# Patient Record
Sex: FEMALE | Race: WHITE | NOT HISPANIC OR LATINO | Employment: OTHER | URBAN - METROPOLITAN AREA
[De-identification: names, ages, dates, MRNs, and addresses within clinical notes are randomized per-mention and may not be internally consistent; named-entity substitution may affect disease eponyms.]

---

## 2019-02-28 ENCOUNTER — TELEPHONE (OUTPATIENT)
Dept: NEUROLOGY | Facility: HOSPITAL | Age: 74
End: 2019-02-28

## 2019-02-28 NOTE — TELEPHONE ENCOUNTER
----- Message from Ijeoma Vo sent at 2/28/2019 11:54 AM CST -----  Contact: Patients daughter  GI- Patients rudy Hensley would like a call back at [phone].

## 2019-02-28 NOTE — TELEPHONE ENCOUNTER
Shellie requesting information on referral.  Shellie to be notified with recommendation from Dr. Lofton.

## 2019-03-01 ENCOUNTER — TELEPHONE (OUTPATIENT)
Dept: NEUROLOGY | Facility: HOSPITAL | Age: 74
End: 2019-03-01

## 2019-03-01 NOTE — TELEPHONE ENCOUNTER
Shellie, daughter, notified Dr. Lofton discussed mother's case with Dr. Light. Dr. Lofton recommend pt to follow up with Dr. Light in [place] to discuss anemia. Shellie states mother has appt with Dr. Light.

## 2019-03-01 NOTE — TELEPHONE ENCOUNTER
----- Message from Maya Pierre sent at 2/28/2019  4:24 PM CST -----  Contact: Patient's daughter Shellie  GI:  Shellie, patient's daughter, returned nurse call.  Shellie can be reached at [phone].  Thanks  abc

## 2019-03-22 ENCOUNTER — TELEPHONE (OUTPATIENT)
Dept: NEUROLOGY | Facility: HOSPITAL | Age: 74
End: 2019-03-22

## 2019-03-22 NOTE — TELEPHONE ENCOUNTER
Rosibel with [facility] in [place] notified Dr. Lofton requested on 2/28/19 to have pt seen by Dr. Light again for anemia.  Rosibel acknowledged understanding.

## 2019-03-22 NOTE — TELEPHONE ENCOUNTER
----- Message from Jose Daniel Infante sent at 3/22/2019 10:44 AM CDT -----  Contact: [facility] [place] [phone]  Caller requested to speak with the nurse to verify if the doctor received the referral they faxed over in Feb. Please call.

## 2019-03-27 ENCOUNTER — TELEPHONE (OUTPATIENT)
Dept: NEUROLOGY | Facility: HOSPITAL | Age: 74
End: 2019-03-27

## 2019-03-27 NOTE — TELEPHONE ENCOUNTER
----- Message from Ijeoma Vo sent at 3/22/2019  3:55 PM CDT -----  Regarding: NET NET  Contact: [phone]  New patient - Who called: Clinic Visit Note faxed form [facility] [place] [phone]    Referred by:[facility] [place]    Why do you need to be seen? 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

## 2019-03-28 NOTE — TELEPHONE ENCOUNTER
Pt has anemia problem and hx NET.  Galina discussing with Dr. Emery and Dr. Augustine office for next steps.

## 2019-03-28 NOTE — TELEPHONE ENCOUNTER
----- Message from Ijeoma Vo sent at 3/22/2019  3:55 PM CDT -----  Regarding: NET NET  Contact: [phone]  New patient - Who called: Clinic Visit Note faxed form [facility] [place] [phone]    Referred by:[facility] [place]    Why do you need to be seen? 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

## 2019-11-18 PROBLEM — C7A.1 HIGH GRADE NEUROENDOCRINE CARCINOMA: Status: ACTIVE | Noted: 2019-11-18

## 2019-11-18 PROBLEM — C92.10 CML (CHRONIC MYELOCYTIC LEUKEMIA): Status: ACTIVE | Noted: 2019-11-18

## 2019-11-18 PROBLEM — D53.9 MACROCYTIC ANEMIA: Status: ACTIVE | Noted: 2019-11-18

## 2019-11-18 PROBLEM — Z85.3 HISTORY OF BILATERAL BREAST CANCER: Status: ACTIVE | Noted: 2019-11-18

## 2019-11-18 PROBLEM — N18.30 STAGE 3 CHRONIC KIDNEY DISEASE: Status: ACTIVE | Noted: 2019-11-18

## 2019-12-11 PROBLEM — I35.0 AORTIC STENOSIS, MODERATE: Status: ACTIVE | Noted: 2019-10-23

## 2019-12-11 PROBLEM — D50.0 IRON DEFICIENCY ANEMIA DUE TO CHRONIC BLOOD LOSS: Status: ACTIVE | Noted: 2019-02-09

## 2019-12-11 PROBLEM — I10 ESSENTIAL HYPERTENSION: Status: ACTIVE | Noted: 2019-12-11

## 2019-12-11 PROBLEM — I50.32 CHRONIC DIASTOLIC CONGESTIVE HEART FAILURE: Status: ACTIVE | Noted: 2018-04-06

## 2019-12-11 PROBLEM — D69.3 IDIOPATHIC THROMBOCYTOPENIC PURPURA: Status: ACTIVE | Noted: 2019-12-11

## 2019-12-11 PROBLEM — I35.1 AORTIC REGURGITATION: Status: ACTIVE | Noted: 2019-12-11

## 2019-12-11 PROBLEM — K21.9 GASTROESOPHAGEAL REFLUX DISEASE WITHOUT ESOPHAGITIS: Status: ACTIVE | Noted: 2019-11-21

## 2019-12-11 PROBLEM — I34.0 NON-RHEUMATIC MITRAL REGURGITATION: Status: ACTIVE | Noted: 2017-05-31

## 2021-05-12 ENCOUNTER — PATIENT MESSAGE (OUTPATIENT)
Dept: RESEARCH | Facility: HOSPITAL | Age: 76
End: 2021-05-12

## 2022-02-22 DIAGNOSIS — D84.9 IMMUNOSUPPRESSED STATUS: ICD-10-CM
